# Patient Record
Sex: FEMALE | Race: WHITE | NOT HISPANIC OR LATINO | URBAN - METROPOLITAN AREA
[De-identification: names, ages, dates, MRNs, and addresses within clinical notes are randomized per-mention and may not be internally consistent; named-entity substitution may affect disease eponyms.]

---

## 2017-01-23 ENCOUNTER — OFFICE (OUTPATIENT)
Dept: URBAN - METROPOLITAN AREA CLINIC 14 | Facility: CLINIC | Age: 37
End: 2017-01-23

## 2017-01-23 VITALS
HEART RATE: 37 BPM | WEIGHT: 286 LBS | SYSTOLIC BLOOD PRESSURE: 130 MMHG | DIASTOLIC BLOOD PRESSURE: 54 MMHG | HEIGHT: 64 IN

## 2017-01-23 DIAGNOSIS — Z98.84 BARIATRIC SURGERY STATUS: ICD-10-CM

## 2017-01-23 DIAGNOSIS — K21.9 GASTRO-ESOPHAGEAL REFLUX DISEASE WITHOUT ESOPHAGITIS: ICD-10-CM

## 2017-01-23 DIAGNOSIS — R10.12 LEFT UPPER QUADRANT PAIN: ICD-10-CM

## 2017-01-23 DIAGNOSIS — R13.10 DYSPHAGIA, UNSPECIFIED: ICD-10-CM

## 2017-01-23 DIAGNOSIS — E66.01 MORBID (SEVERE) OBESITY DUE TO EXCESS CALORIES: ICD-10-CM

## 2017-01-23 PROCEDURE — 99214 OFFICE O/P EST MOD 30 MIN: CPT | Performed by: NURSE PRACTITIONER

## 2017-01-23 RX ORDER — HYOSCYAMINE SULFATE 0.12 MG/1
TABLET ORAL
Qty: 40 | Refills: 1 | Status: COMPLETED
Start: 2017-01-23 | End: 2017-01-25

## 2017-02-10 ENCOUNTER — AMBULATORY SURGICAL CENTER (OUTPATIENT)
Dept: URBAN - METROPOLITAN AREA SURGERY 2 | Facility: SURGERY | Age: 37
End: 2017-02-10

## 2017-02-10 ENCOUNTER — OFFICE (OUTPATIENT)
Dept: URBAN - METROPOLITAN AREA CLINIC 11 | Facility: CLINIC | Age: 37
End: 2017-02-10

## 2017-02-10 ENCOUNTER — AMBULATORY SURGICAL CENTER (OUTPATIENT)
Dept: URBAN - METROPOLITAN AREA SURGERY 2 | Facility: SURGERY | Age: 37
End: 2017-02-10
Payer: COMMERCIAL

## 2017-02-10 VITALS
SYSTOLIC BLOOD PRESSURE: 93 MMHG | RESPIRATION RATE: 14 BRPM | HEART RATE: 70 BPM | SYSTOLIC BLOOD PRESSURE: 95 MMHG | HEART RATE: 71 BPM | SYSTOLIC BLOOD PRESSURE: 97 MMHG | OXYGEN SATURATION: 99 % | SYSTOLIC BLOOD PRESSURE: 97 MMHG | RESPIRATION RATE: 14 BRPM | DIASTOLIC BLOOD PRESSURE: 56 MMHG | SYSTOLIC BLOOD PRESSURE: 93 MMHG | HEART RATE: 67 BPM | DIASTOLIC BLOOD PRESSURE: 35 MMHG | OXYGEN SATURATION: 99 % | DIASTOLIC BLOOD PRESSURE: 67 MMHG | WEIGHT: 280 LBS | HEART RATE: 69 BPM | DIASTOLIC BLOOD PRESSURE: 37 MMHG | OXYGEN SATURATION: 98 % | TEMPERATURE: 98 F | DIASTOLIC BLOOD PRESSURE: 56 MMHG | SYSTOLIC BLOOD PRESSURE: 105 MMHG | RESPIRATION RATE: 16 BRPM | OXYGEN SATURATION: 98 % | HEART RATE: 70 BPM | HEIGHT: 64 IN | HEIGHT: 64 IN | WEIGHT: 280 LBS | DIASTOLIC BLOOD PRESSURE: 67 MMHG | DIASTOLIC BLOOD PRESSURE: 37 MMHG | HEART RATE: 69 BPM | SYSTOLIC BLOOD PRESSURE: 95 MMHG | DIASTOLIC BLOOD PRESSURE: 35 MMHG | HEART RATE: 71 BPM | HEART RATE: 67 BPM | RESPIRATION RATE: 16 BRPM | SYSTOLIC BLOOD PRESSURE: 105 MMHG | TEMPERATURE: 98 F

## 2017-02-10 DIAGNOSIS — R93.3 ABNORMAL FINDINGS ON DIAGNOSTIC IMAGING OF OTHER PARTS OF DI: ICD-10-CM

## 2017-02-10 DIAGNOSIS — K25.9 GASTRIC ULCER, UNSPECIFIED AS ACUTE OR CHRONIC, WITHOUT HEMO: ICD-10-CM

## 2017-02-10 DIAGNOSIS — Z98.84 BARIATRIC SURGERY STATUS: ICD-10-CM

## 2017-02-10 DIAGNOSIS — R13.10 DYSPHAGIA, UNSPECIFIED: ICD-10-CM

## 2017-02-10 DIAGNOSIS — K22.2 ESOPHAGEAL OBSTRUCTION: ICD-10-CM

## 2017-02-10 DIAGNOSIS — Z48.815 ENCOUNTER FOR SURGICAL AFTERCARE FOLLOWING SURGERY ON THE DI: ICD-10-CM

## 2017-02-10 PROCEDURE — 88342 IMHCHEM/IMCYTCHM 1ST ANTB: CPT | Performed by: INTERNAL MEDICINE

## 2017-02-10 PROCEDURE — 43239 EGD BIOPSY SINGLE/MULTIPLE: CPT | Mod: 51 | Performed by: INTERNAL MEDICINE

## 2017-02-10 PROCEDURE — 88313 SPECIAL STAINS GROUP 2: CPT | Performed by: INTERNAL MEDICINE

## 2017-02-10 PROCEDURE — 43249 ESOPH EGD DILATION <30 MM: CPT | Performed by: INTERNAL MEDICINE

## 2017-02-10 PROCEDURE — 88305 TISSUE EXAM BY PATHOLOGIST: CPT | Performed by: INTERNAL MEDICINE

## 2017-02-10 PROCEDURE — G8918 PT W/O PREOP ORDER IV AB PRO: HCPCS | Performed by: INTERNAL MEDICINE

## 2017-02-10 PROCEDURE — G8907 PT DOC NO EVENTS ON DISCHARG: HCPCS | Performed by: INTERNAL MEDICINE

## 2017-06-05 ENCOUNTER — OFFICE (OUTPATIENT)
Dept: URBAN - METROPOLITAN AREA CLINIC 14 | Facility: CLINIC | Age: 37
End: 2017-06-05

## 2017-06-05 VITALS
WEIGHT: 289 LBS | DIASTOLIC BLOOD PRESSURE: 54 MMHG | HEIGHT: 64 IN | HEART RATE: 42 BPM | SYSTOLIC BLOOD PRESSURE: 95 MMHG

## 2017-06-05 DIAGNOSIS — R19.7 DIARRHEA, UNSPECIFIED: ICD-10-CM

## 2017-06-05 PROCEDURE — 99214 OFFICE O/P EST MOD 30 MIN: CPT | Performed by: INTERNAL MEDICINE

## 2017-06-05 RX ORDER — HYOSCYAMINE SULFATE EXTENDED-RELEASE 0.38 MG/1
TABLET ORAL
Qty: 60 | Refills: 1 | Status: ACTIVE
Start: 2017-06-05

## 2017-06-07 LAB — MOLECULAR STOOL PATHOGEN PANEL: STPANL: (no result)

## 2017-06-22 ENCOUNTER — OFFICE (OUTPATIENT)
Dept: URBAN - METROPOLITAN AREA CLINIC 14 | Facility: CLINIC | Age: 37
End: 2017-06-22

## 2017-06-22 VITALS
DIASTOLIC BLOOD PRESSURE: 57 MMHG | WEIGHT: 293 LBS | HEIGHT: 64 IN | SYSTOLIC BLOOD PRESSURE: 112 MMHG | HEART RATE: 36 BPM

## 2017-06-22 DIAGNOSIS — Z98.84 BARIATRIC SURGERY STATUS: ICD-10-CM

## 2017-06-22 DIAGNOSIS — R13.10 DYSPHAGIA, UNSPECIFIED: ICD-10-CM

## 2017-06-22 DIAGNOSIS — A04.7 ENTEROCOLITIS DUE TO CLOSTRIDIUM DIFFICILE: ICD-10-CM

## 2017-06-22 PROCEDURE — 99214 OFFICE O/P EST MOD 30 MIN: CPT | Performed by: NURSE PRACTITIONER

## 2017-08-04 ENCOUNTER — AMBULATORY SURGICAL CENTER (OUTPATIENT)
Dept: URBAN - METROPOLITAN AREA SURGERY 2 | Facility: SURGERY | Age: 37
End: 2017-08-04

## 2017-08-04 ENCOUNTER — AMBULATORY SURGICAL CENTER (OUTPATIENT)
Dept: URBAN - METROPOLITAN AREA SURGERY 2 | Facility: SURGERY | Age: 37
End: 2017-08-04
Payer: COMMERCIAL

## 2017-08-04 VITALS
DIASTOLIC BLOOD PRESSURE: 57 MMHG | SYSTOLIC BLOOD PRESSURE: 108 MMHG | DIASTOLIC BLOOD PRESSURE: 59 MMHG | RESPIRATION RATE: 18 BRPM | SYSTOLIC BLOOD PRESSURE: 112 MMHG | SYSTOLIC BLOOD PRESSURE: 101 MMHG | TEMPERATURE: 98.3 F | DIASTOLIC BLOOD PRESSURE: 55 MMHG | OXYGEN SATURATION: 98 % | SYSTOLIC BLOOD PRESSURE: 107 MMHG | OXYGEN SATURATION: 97 % | HEIGHT: 64 IN | RESPIRATION RATE: 16 BRPM | OXYGEN SATURATION: 99 % | DIASTOLIC BLOOD PRESSURE: 55 MMHG | OXYGEN SATURATION: 97 % | RESPIRATION RATE: 16 BRPM | DIASTOLIC BLOOD PRESSURE: 57 MMHG | TEMPERATURE: 98.1 F | SYSTOLIC BLOOD PRESSURE: 107 MMHG | HEART RATE: 57 BPM | OXYGEN SATURATION: 98 % | HEART RATE: 59 BPM | SYSTOLIC BLOOD PRESSURE: 108 MMHG | HEART RATE: 57 BPM | RESPIRATION RATE: 18 BRPM | HEART RATE: 60 BPM | OXYGEN SATURATION: 99 % | TEMPERATURE: 98.3 F | SYSTOLIC BLOOD PRESSURE: 101 MMHG | HEIGHT: 64 IN | WEIGHT: 293 LBS | DIASTOLIC BLOOD PRESSURE: 59 MMHG | DIASTOLIC BLOOD PRESSURE: 60 MMHG | TEMPERATURE: 98.1 F | DIASTOLIC BLOOD PRESSURE: 60 MMHG | WEIGHT: 293 LBS | HEART RATE: 59 BPM | SYSTOLIC BLOOD PRESSURE: 112 MMHG | HEART RATE: 60 BPM

## 2017-08-04 DIAGNOSIS — K22.2 ESOPHAGEAL OBSTRUCTION: ICD-10-CM

## 2017-08-04 DIAGNOSIS — R13.10 DYSPHAGIA, UNSPECIFIED: ICD-10-CM

## 2017-08-04 DIAGNOSIS — Z48.815 ENCOUNTER FOR SURGICAL AFTERCARE FOLLOWING SURGERY ON THE DI: ICD-10-CM

## 2017-08-04 PROCEDURE — 43249 ESOPH EGD DILATION <30 MM: CPT | Performed by: INTERNAL MEDICINE

## 2018-01-15 ENCOUNTER — OFFICE (OUTPATIENT)
Dept: URBAN - METROPOLITAN AREA CLINIC 14 | Facility: CLINIC | Age: 38
End: 2018-01-15

## 2018-01-15 VITALS
HEART RATE: 44 BPM | HEIGHT: 64 IN | DIASTOLIC BLOOD PRESSURE: 57 MMHG | SYSTOLIC BLOOD PRESSURE: 119 MMHG | WEIGHT: 293 LBS

## 2018-01-15 DIAGNOSIS — K25.9 GASTRIC ULCER, UNSPECIFIED AS ACUTE OR CHRONIC, WITHOUT HEMO: ICD-10-CM

## 2018-01-15 DIAGNOSIS — R13.10 DYSPHAGIA, UNSPECIFIED: ICD-10-CM

## 2018-01-15 PROCEDURE — 99213 OFFICE O/P EST LOW 20 MIN: CPT | Performed by: INTERNAL MEDICINE

## 2018-01-23 ENCOUNTER — AMBULATORY SURGICAL CENTER (OUTPATIENT)
Dept: URBAN - METROPOLITAN AREA SURGERY 2 | Facility: SURGERY | Age: 38
End: 2018-01-23

## 2018-01-23 VITALS
HEART RATE: 62 BPM | HEART RATE: 62 BPM | SYSTOLIC BLOOD PRESSURE: 101 MMHG | DIASTOLIC BLOOD PRESSURE: 67 MMHG | TEMPERATURE: 98.4 F | SYSTOLIC BLOOD PRESSURE: 100 MMHG | HEART RATE: 60 BPM | DIASTOLIC BLOOD PRESSURE: 67 MMHG | OXYGEN SATURATION: 96 % | RESPIRATION RATE: 16 BRPM | RESPIRATION RATE: 18 BRPM | SYSTOLIC BLOOD PRESSURE: 100 MMHG | TEMPERATURE: 98.4 F | DIASTOLIC BLOOD PRESSURE: 51 MMHG | OXYGEN SATURATION: 98 % | HEIGHT: 64 IN | WEIGHT: 293 LBS | OXYGEN SATURATION: 97 % | DIASTOLIC BLOOD PRESSURE: 51 MMHG | HEART RATE: 64 BPM | SYSTOLIC BLOOD PRESSURE: 109 MMHG | SYSTOLIC BLOOD PRESSURE: 101 MMHG | SYSTOLIC BLOOD PRESSURE: 109 MMHG | DIASTOLIC BLOOD PRESSURE: 55 MMHG | HEART RATE: 65 BPM | HEIGHT: 64 IN | HEART RATE: 64 BPM | OXYGEN SATURATION: 98 % | RESPIRATION RATE: 18 BRPM | DIASTOLIC BLOOD PRESSURE: 43 MMHG | RESPIRATION RATE: 16 BRPM | OXYGEN SATURATION: 96 % | HEART RATE: 60 BPM | TEMPERATURE: 98.2 F | OXYGEN SATURATION: 97 % | DIASTOLIC BLOOD PRESSURE: 55 MMHG | TEMPERATURE: 98.2 F | DIASTOLIC BLOOD PRESSURE: 43 MMHG | WEIGHT: 293 LBS | HEART RATE: 65 BPM

## 2018-01-23 DIAGNOSIS — Z48.815 ENCOUNTER FOR SURGICAL AFTERCARE FOLLOWING SURGERY ON THE DI: ICD-10-CM

## 2018-01-23 DIAGNOSIS — K22.2 ESOPHAGEAL OBSTRUCTION: ICD-10-CM

## 2018-01-23 DIAGNOSIS — R13.10 DYSPHAGIA, UNSPECIFIED: ICD-10-CM

## 2018-01-23 DIAGNOSIS — R10.12 LEFT UPPER QUADRANT PAIN: ICD-10-CM

## 2018-01-23 PROCEDURE — 43248 EGD GUIDE WIRE INSERTION: CPT | Performed by: INTERNAL MEDICINE

## 2018-05-01 ENCOUNTER — OFFICE (OUTPATIENT)
Dept: URBAN - METROPOLITAN AREA CLINIC 11 | Facility: CLINIC | Age: 38
End: 2018-05-01

## 2018-05-01 VITALS
HEIGHT: 64 IN | SYSTOLIC BLOOD PRESSURE: 122 MMHG | WEIGHT: 293 LBS | HEART RATE: 74 BPM | DIASTOLIC BLOOD PRESSURE: 61 MMHG

## 2018-05-01 DIAGNOSIS — R11.0 NAUSEA: ICD-10-CM

## 2018-05-01 DIAGNOSIS — Z98.84 BARIATRIC SURGERY STATUS: ICD-10-CM

## 2018-05-01 DIAGNOSIS — K21.9 GASTRO-ESOPHAGEAL REFLUX DISEASE WITHOUT ESOPHAGITIS: ICD-10-CM

## 2018-05-01 DIAGNOSIS — R13.10 DYSPHAGIA, UNSPECIFIED: ICD-10-CM

## 2018-05-01 DIAGNOSIS — R10.11 RIGHT UPPER QUADRANT PAIN: ICD-10-CM

## 2018-05-01 PROCEDURE — 99214 OFFICE O/P EST MOD 30 MIN: CPT | Performed by: NURSE PRACTITIONER

## 2018-05-17 ENCOUNTER — AMBULATORY SURGICAL CENTER (OUTPATIENT)
Dept: URBAN - METROPOLITAN AREA SURGERY 3 | Facility: SURGERY | Age: 38
End: 2018-05-17

## 2018-05-17 ENCOUNTER — AMBULATORY SURGICAL CENTER (OUTPATIENT)
Dept: URBAN - METROPOLITAN AREA SURGERY 3 | Facility: SURGERY | Age: 38
End: 2018-05-17
Payer: COMMERCIAL

## 2018-05-17 ENCOUNTER — OFFICE (OUTPATIENT)
Dept: URBAN - METROPOLITAN AREA PATHOLOGY 22 | Facility: PATHOLOGY | Age: 38
End: 2018-05-17

## 2018-05-17 VITALS
HEART RATE: 68 BPM | DIASTOLIC BLOOD PRESSURE: 74 MMHG | RESPIRATION RATE: 17 BRPM | HEART RATE: 56 BPM | OXYGEN SATURATION: 98 % | DIASTOLIC BLOOD PRESSURE: 69 MMHG | SYSTOLIC BLOOD PRESSURE: 103 MMHG | HEART RATE: 55 BPM | OXYGEN SATURATION: 97 % | DIASTOLIC BLOOD PRESSURE: 58 MMHG | DIASTOLIC BLOOD PRESSURE: 69 MMHG | OXYGEN SATURATION: 95 % | DIASTOLIC BLOOD PRESSURE: 74 MMHG | SYSTOLIC BLOOD PRESSURE: 112 MMHG | HEART RATE: 45 BPM | DIASTOLIC BLOOD PRESSURE: 58 MMHG | DIASTOLIC BLOOD PRESSURE: 61 MMHG | SYSTOLIC BLOOD PRESSURE: 101 MMHG | SYSTOLIC BLOOD PRESSURE: 100 MMHG | HEART RATE: 40 BPM | DIASTOLIC BLOOD PRESSURE: 44 MMHG | HEART RATE: 59 BPM | SYSTOLIC BLOOD PRESSURE: 115 MMHG | TEMPERATURE: 98.3 F | SYSTOLIC BLOOD PRESSURE: 132 MMHG | SYSTOLIC BLOOD PRESSURE: 116 MMHG | OXYGEN SATURATION: 91 % | HEART RATE: 40 BPM | HEART RATE: 64 BPM | RESPIRATION RATE: 20 BRPM | OXYGEN SATURATION: 91 % | OXYGEN SATURATION: 92 % | SYSTOLIC BLOOD PRESSURE: 96 MMHG | SYSTOLIC BLOOD PRESSURE: 129 MMHG | DIASTOLIC BLOOD PRESSURE: 67 MMHG | DIASTOLIC BLOOD PRESSURE: 47 MMHG | DIASTOLIC BLOOD PRESSURE: 40 MMHG | TEMPERATURE: 98.3 F | RESPIRATION RATE: 17 BRPM | SYSTOLIC BLOOD PRESSURE: 119 MMHG | RESPIRATION RATE: 15 BRPM | SYSTOLIC BLOOD PRESSURE: 96 MMHG | DIASTOLIC BLOOD PRESSURE: 76 MMHG | DIASTOLIC BLOOD PRESSURE: 68 MMHG | SYSTOLIC BLOOD PRESSURE: 106 MMHG | SYSTOLIC BLOOD PRESSURE: 125 MMHG | HEIGHT: 64 IN | HEART RATE: 72 BPM | HEART RATE: 55 BPM | RESPIRATION RATE: 20 BRPM | HEART RATE: 42 BPM | HEART RATE: 64 BPM | DIASTOLIC BLOOD PRESSURE: 57 MMHG | SYSTOLIC BLOOD PRESSURE: 125 MMHG | OXYGEN SATURATION: 97 % | SYSTOLIC BLOOD PRESSURE: 103 MMHG | HEART RATE: 69 BPM | WEIGHT: 293 LBS | SYSTOLIC BLOOD PRESSURE: 115 MMHG | SYSTOLIC BLOOD PRESSURE: 120 MMHG | DIASTOLIC BLOOD PRESSURE: 56 MMHG | DIASTOLIC BLOOD PRESSURE: 52 MMHG | DIASTOLIC BLOOD PRESSURE: 61 MMHG | RESPIRATION RATE: 14 BRPM | HEART RATE: 70 BPM | DIASTOLIC BLOOD PRESSURE: 36 MMHG | RESPIRATION RATE: 14 BRPM | DIASTOLIC BLOOD PRESSURE: 57 MMHG | SYSTOLIC BLOOD PRESSURE: 112 MMHG | HEART RATE: 61 BPM | HEART RATE: 59 BPM | TEMPERATURE: 97 F | SYSTOLIC BLOOD PRESSURE: 120 MMHG | SYSTOLIC BLOOD PRESSURE: 99 MMHG | SYSTOLIC BLOOD PRESSURE: 106 MMHG | SYSTOLIC BLOOD PRESSURE: 118 MMHG | DIASTOLIC BLOOD PRESSURE: 37 MMHG | HEART RATE: 69 BPM | OXYGEN SATURATION: 96 % | OXYGEN SATURATION: 92 % | RESPIRATION RATE: 15 BRPM | DIASTOLIC BLOOD PRESSURE: 47 MMHG | HEART RATE: 62 BPM | RESPIRATION RATE: 18 BRPM | DIASTOLIC BLOOD PRESSURE: 36 MMHG | WEIGHT: 293 LBS | HEART RATE: 36 BPM | RESPIRATION RATE: 16 BRPM | HEART RATE: 56 BPM | SYSTOLIC BLOOD PRESSURE: 111 MMHG | RESPIRATION RATE: 18 BRPM | HEART RATE: 45 BPM | DIASTOLIC BLOOD PRESSURE: 67 MMHG | DIASTOLIC BLOOD PRESSURE: 76 MMHG | DIASTOLIC BLOOD PRESSURE: 52 MMHG | RESPIRATION RATE: 16 BRPM | HEART RATE: 61 BPM | TEMPERATURE: 97 F | SYSTOLIC BLOOD PRESSURE: 132 MMHG | RESPIRATION RATE: 19 BRPM | SYSTOLIC BLOOD PRESSURE: 118 MMHG | RESPIRATION RATE: 19 BRPM | OXYGEN SATURATION: 98 % | HEART RATE: 72 BPM | HEART RATE: 42 BPM | DIASTOLIC BLOOD PRESSURE: 64 MMHG | HEART RATE: 36 BPM | SYSTOLIC BLOOD PRESSURE: 100 MMHG | OXYGEN SATURATION: 95 % | SYSTOLIC BLOOD PRESSURE: 116 MMHG | HEART RATE: 68 BPM | DIASTOLIC BLOOD PRESSURE: 37 MMHG | SYSTOLIC BLOOD PRESSURE: 129 MMHG | HEART RATE: 74 BPM | SYSTOLIC BLOOD PRESSURE: 101 MMHG | OXYGEN SATURATION: 99 % | DIASTOLIC BLOOD PRESSURE: 40 MMHG | HEART RATE: 70 BPM | DIASTOLIC BLOOD PRESSURE: 68 MMHG | SYSTOLIC BLOOD PRESSURE: 99 MMHG | DIASTOLIC BLOOD PRESSURE: 44 MMHG | HEART RATE: 74 BPM | DIASTOLIC BLOOD PRESSURE: 64 MMHG | HEART RATE: 62 BPM | DIASTOLIC BLOOD PRESSURE: 56 MMHG | HEIGHT: 64 IN | SYSTOLIC BLOOD PRESSURE: 111 MMHG | OXYGEN SATURATION: 96 % | SYSTOLIC BLOOD PRESSURE: 119 MMHG | OXYGEN SATURATION: 99 %

## 2018-05-17 DIAGNOSIS — K22.2 ESOPHAGEAL OBSTRUCTION: ICD-10-CM

## 2018-05-17 DIAGNOSIS — R13.10 DYSPHAGIA, UNSPECIFIED: ICD-10-CM

## 2018-05-17 DIAGNOSIS — K29.50 UNSPECIFIED CHRONIC GASTRITIS WITHOUT BLEEDING: ICD-10-CM

## 2018-05-17 DIAGNOSIS — R10.11 RIGHT UPPER QUADRANT PAIN: ICD-10-CM

## 2018-05-17 PROBLEM — K31.89 OTHER DISEASES OF STOMACH AND DUODENUM: Status: ACTIVE | Noted: 2018-05-17

## 2018-05-17 PROCEDURE — 43249 ESOPH EGD DILATION <30 MM: CPT | Performed by: INTERNAL MEDICINE

## 2018-05-17 PROCEDURE — 88313 SPECIAL STAINS GROUP 2: CPT | Performed by: INTERNAL MEDICINE

## 2018-05-17 PROCEDURE — G8907 PT DOC NO EVENTS ON DISCHARG: HCPCS | Performed by: INTERNAL MEDICINE

## 2018-05-17 PROCEDURE — 43239 EGD BIOPSY SINGLE/MULTIPLE: CPT | Mod: 59 | Performed by: INTERNAL MEDICINE

## 2018-05-17 PROCEDURE — 88305 TISSUE EXAM BY PATHOLOGIST: CPT | Performed by: INTERNAL MEDICINE

## 2018-05-17 PROCEDURE — G8918 PT W/O PREOP ORDER IV AB PRO: HCPCS | Performed by: INTERNAL MEDICINE

## 2018-05-17 PROCEDURE — 88342 IMHCHEM/IMCYTCHM 1ST ANTB: CPT | Performed by: INTERNAL MEDICINE

## 2018-06-18 ENCOUNTER — OFFICE (OUTPATIENT)
Dept: URBAN - METROPOLITAN AREA CLINIC 11 | Facility: CLINIC | Age: 38
End: 2018-06-18

## 2018-12-06 ENCOUNTER — OFFICE (OUTPATIENT)
Dept: URBAN - METROPOLITAN AREA CLINIC 14 | Facility: CLINIC | Age: 38
End: 2018-12-06

## 2018-12-06 VITALS
WEIGHT: 293 LBS | DIASTOLIC BLOOD PRESSURE: 80 MMHG | HEART RATE: 108 BPM | HEIGHT: 64 IN | SYSTOLIC BLOOD PRESSURE: 153 MMHG

## 2018-12-06 DIAGNOSIS — R13.10 DYSPHAGIA, UNSPECIFIED: ICD-10-CM

## 2018-12-06 PROCEDURE — 99213 OFFICE O/P EST LOW 20 MIN: CPT | Performed by: INTERNAL MEDICINE

## 2018-12-07 ENCOUNTER — AMBULATORY SURGICAL CENTER (OUTPATIENT)
Dept: URBAN - METROPOLITAN AREA SURGERY 2 | Facility: SURGERY | Age: 38
End: 2018-12-07
Payer: COMMERCIAL

## 2018-12-07 ENCOUNTER — AMBULATORY SURGICAL CENTER (OUTPATIENT)
Dept: URBAN - METROPOLITAN AREA SURGERY 2 | Facility: SURGERY | Age: 38
End: 2018-12-07

## 2018-12-07 VITALS
OXYGEN SATURATION: 98 % | DIASTOLIC BLOOD PRESSURE: 73 MMHG | SYSTOLIC BLOOD PRESSURE: 123 MMHG | RESPIRATION RATE: 14 BRPM | HEART RATE: 68 BPM | DIASTOLIC BLOOD PRESSURE: 56 MMHG | WEIGHT: 293 LBS | DIASTOLIC BLOOD PRESSURE: 56 MMHG | HEART RATE: 75 BPM | DIASTOLIC BLOOD PRESSURE: 59 MMHG | SYSTOLIC BLOOD PRESSURE: 119 MMHG | HEART RATE: 67 BPM | HEIGHT: 64 IN | TEMPERATURE: 98.3 F | HEART RATE: 76 BPM | SYSTOLIC BLOOD PRESSURE: 119 MMHG | DIASTOLIC BLOOD PRESSURE: 73 MMHG | SYSTOLIC BLOOD PRESSURE: 123 MMHG | SYSTOLIC BLOOD PRESSURE: 129 MMHG | HEART RATE: 67 BPM | HEART RATE: 76 BPM | HEIGHT: 64 IN | WEIGHT: 293 LBS | SYSTOLIC BLOOD PRESSURE: 111 MMHG | TEMPERATURE: 97.8 F | OXYGEN SATURATION: 97 % | DIASTOLIC BLOOD PRESSURE: 84 MMHG | DIASTOLIC BLOOD PRESSURE: 84 MMHG | OXYGEN SATURATION: 95 % | RESPIRATION RATE: 14 BRPM | OXYGEN SATURATION: 98 % | OXYGEN SATURATION: 97 % | SYSTOLIC BLOOD PRESSURE: 111 MMHG | HEART RATE: 75 BPM | OXYGEN SATURATION: 95 % | HEART RATE: 68 BPM | TEMPERATURE: 98.3 F | SYSTOLIC BLOOD PRESSURE: 129 MMHG | DIASTOLIC BLOOD PRESSURE: 59 MMHG | TEMPERATURE: 97.8 F

## 2018-12-07 DIAGNOSIS — K22.2 ESOPHAGEAL OBSTRUCTION: ICD-10-CM

## 2018-12-07 DIAGNOSIS — R13.10 DYSPHAGIA, UNSPECIFIED: ICD-10-CM

## 2018-12-07 DIAGNOSIS — Z48.815 ENCOUNTER FOR SURGICAL AFTERCARE FOLLOWING SURGERY ON THE DI: ICD-10-CM

## 2018-12-07 PROCEDURE — 43249 ESOPH EGD DILATION <30 MM: CPT | Performed by: INTERNAL MEDICINE

## 2018-12-07 PROCEDURE — G8907 PT DOC NO EVENTS ON DISCHARG: HCPCS | Performed by: INTERNAL MEDICINE

## 2018-12-07 PROCEDURE — G8918 PT W/O PREOP ORDER IV AB PRO: HCPCS | Performed by: INTERNAL MEDICINE

## 2019-02-11 ENCOUNTER — OFFICE (OUTPATIENT)
Dept: URBAN - METROPOLITAN AREA CLINIC 14 | Facility: CLINIC | Age: 39
End: 2019-02-11

## 2019-02-11 VITALS
WEIGHT: 293 LBS | HEART RATE: 85 BPM | DIASTOLIC BLOOD PRESSURE: 62 MMHG | HEIGHT: 64 IN | SYSTOLIC BLOOD PRESSURE: 131 MMHG

## 2019-02-11 DIAGNOSIS — R10.12 LEFT UPPER QUADRANT PAIN: ICD-10-CM

## 2019-02-11 DIAGNOSIS — R11.0 NAUSEA: ICD-10-CM

## 2019-02-11 PROCEDURE — 99213 OFFICE O/P EST LOW 20 MIN: CPT | Performed by: INTERNAL MEDICINE

## 2019-02-21 ENCOUNTER — AMBULATORY SURGICAL CENTER (OUTPATIENT)
Dept: URBAN - METROPOLITAN AREA SURGERY 2 | Facility: SURGERY | Age: 39
End: 2019-02-21

## 2019-02-21 ENCOUNTER — OFFICE (OUTPATIENT)
Dept: URBAN - METROPOLITAN AREA PATHOLOGY 22 | Facility: PATHOLOGY | Age: 39
End: 2019-02-21
Payer: MEDICARE

## 2019-02-21 ENCOUNTER — AMBULATORY SURGICAL CENTER (OUTPATIENT)
Dept: URBAN - METROPOLITAN AREA SURGERY 2 | Facility: SURGERY | Age: 39
End: 2019-02-21
Payer: COMMERCIAL

## 2019-02-21 VITALS
WEIGHT: 293 LBS | RESPIRATION RATE: 18 BRPM | OXYGEN SATURATION: 97 % | HEIGHT: 64 IN | RESPIRATION RATE: 16 BRPM | SYSTOLIC BLOOD PRESSURE: 143 MMHG | DIASTOLIC BLOOD PRESSURE: 70 MMHG | OXYGEN SATURATION: 96 % | TEMPERATURE: 98.5 F | OXYGEN SATURATION: 96 % | HEART RATE: 75 BPM | OXYGEN SATURATION: 95 % | TEMPERATURE: 98.4 F | SYSTOLIC BLOOD PRESSURE: 140 MMHG | SYSTOLIC BLOOD PRESSURE: 147 MMHG | HEART RATE: 71 BPM | DIASTOLIC BLOOD PRESSURE: 77 MMHG | TEMPERATURE: 98.4 F | SYSTOLIC BLOOD PRESSURE: 135 MMHG | DIASTOLIC BLOOD PRESSURE: 67 MMHG | DIASTOLIC BLOOD PRESSURE: 91 MMHG | HEART RATE: 73 BPM | RESPIRATION RATE: 16 BRPM | RESPIRATION RATE: 18 BRPM | DIASTOLIC BLOOD PRESSURE: 70 MMHG | HEIGHT: 64 IN | SYSTOLIC BLOOD PRESSURE: 147 MMHG | HEART RATE: 71 BPM | SYSTOLIC BLOOD PRESSURE: 135 MMHG | SYSTOLIC BLOOD PRESSURE: 140 MMHG | DIASTOLIC BLOOD PRESSURE: 67 MMHG | DIASTOLIC BLOOD PRESSURE: 77 MMHG | HEART RATE: 73 BPM | OXYGEN SATURATION: 95 % | WEIGHT: 293 LBS | TEMPERATURE: 98.5 F | SYSTOLIC BLOOD PRESSURE: 143 MMHG | DIASTOLIC BLOOD PRESSURE: 91 MMHG | OXYGEN SATURATION: 97 % | HEART RATE: 75 BPM

## 2019-02-21 DIAGNOSIS — E66.01 MORBID (SEVERE) OBESITY DUE TO EXCESS CALORIES: ICD-10-CM

## 2019-02-21 DIAGNOSIS — Z48.815 ENCOUNTER FOR SURGICAL AFTERCARE FOLLOWING SURGERY ON THE DI: ICD-10-CM

## 2019-02-21 DIAGNOSIS — R10.10 UPPER ABDOMINAL PAIN, UNSPECIFIED: ICD-10-CM

## 2019-02-21 DIAGNOSIS — K25.9 GASTRIC ULCER, UNSPECIFIED AS ACUTE OR CHRONIC, WITHOUT HEMO: ICD-10-CM

## 2019-02-21 PROBLEM — K31.89 OTHER DISEASES OF STOMACH AND DUODENUM: Status: ACTIVE | Noted: 2019-02-21

## 2019-02-21 PROCEDURE — 88305 TISSUE EXAM BY PATHOLOGIST: CPT | Performed by: INTERNAL MEDICINE

## 2019-02-21 PROCEDURE — G8907 PT DOC NO EVENTS ON DISCHARG: HCPCS | Performed by: INTERNAL MEDICINE

## 2019-02-21 PROCEDURE — G8918 PT W/O PREOP ORDER IV AB PRO: HCPCS | Performed by: INTERNAL MEDICINE

## 2019-02-21 PROCEDURE — 43239 EGD BIOPSY SINGLE/MULTIPLE: CPT | Performed by: INTERNAL MEDICINE

## 2019-06-11 ENCOUNTER — OFFICE (OUTPATIENT)
Dept: URBAN - METROPOLITAN AREA CLINIC 11 | Facility: CLINIC | Age: 39
End: 2019-06-11

## 2019-06-11 VITALS
HEART RATE: 84 BPM | DIASTOLIC BLOOD PRESSURE: 76 MMHG | SYSTOLIC BLOOD PRESSURE: 122 MMHG | HEIGHT: 64 IN | WEIGHT: 293 LBS

## 2019-06-11 DIAGNOSIS — Z98.84 BARIATRIC SURGERY STATUS: ICD-10-CM

## 2019-06-11 DIAGNOSIS — K59.00 CONSTIPATION, UNSPECIFIED: ICD-10-CM

## 2019-06-11 DIAGNOSIS — R13.10 DYSPHAGIA, UNSPECIFIED: ICD-10-CM

## 2019-06-11 PROCEDURE — 99213 OFFICE O/P EST LOW 20 MIN: CPT | Performed by: INTERNAL MEDICINE

## 2019-08-16 ENCOUNTER — AMBULATORY SURGICAL CENTER (OUTPATIENT)
Dept: URBAN - METROPOLITAN AREA SURGERY 2 | Facility: SURGERY | Age: 39
End: 2019-08-16

## 2019-08-16 ENCOUNTER — AMBULATORY SURGICAL CENTER (OUTPATIENT)
Dept: URBAN - METROPOLITAN AREA SURGERY 2 | Facility: SURGERY | Age: 39
End: 2019-08-16
Payer: COMMERCIAL

## 2019-08-16 ENCOUNTER — OFFICE (OUTPATIENT)
Dept: URBAN - METROPOLITAN AREA PATHOLOGY 22 | Facility: PATHOLOGY | Age: 39
End: 2019-08-16

## 2019-08-16 VITALS
DIASTOLIC BLOOD PRESSURE: 60 MMHG | SYSTOLIC BLOOD PRESSURE: 122 MMHG | WEIGHT: 293 LBS | DIASTOLIC BLOOD PRESSURE: 70 MMHG | WEIGHT: 293 LBS | RESPIRATION RATE: 18 BRPM | SYSTOLIC BLOOD PRESSURE: 111 MMHG | WEIGHT: 293 LBS | DIASTOLIC BLOOD PRESSURE: 70 MMHG | SYSTOLIC BLOOD PRESSURE: 122 MMHG | TEMPERATURE: 98.1 F | HEIGHT: 64 IN | DIASTOLIC BLOOD PRESSURE: 60 MMHG | HEART RATE: 64 BPM | TEMPERATURE: 98 F | OXYGEN SATURATION: 98 % | SYSTOLIC BLOOD PRESSURE: 111 MMHG | OXYGEN SATURATION: 97 % | HEART RATE: 66 BPM | TEMPERATURE: 98 F | HEIGHT: 64 IN | SYSTOLIC BLOOD PRESSURE: 117 MMHG | OXYGEN SATURATION: 98 % | HEART RATE: 64 BPM | OXYGEN SATURATION: 96 % | RESPIRATION RATE: 18 BRPM | TEMPERATURE: 98.1 F | DIASTOLIC BLOOD PRESSURE: 60 MMHG | OXYGEN SATURATION: 98 % | SYSTOLIC BLOOD PRESSURE: 122 MMHG | DIASTOLIC BLOOD PRESSURE: 70 MMHG | DIASTOLIC BLOOD PRESSURE: 65 MMHG | HEART RATE: 66 BPM | DIASTOLIC BLOOD PRESSURE: 65 MMHG | TEMPERATURE: 98.1 F | TEMPERATURE: 98 F | SYSTOLIC BLOOD PRESSURE: 124 MMHG | OXYGEN SATURATION: 97 % | HEART RATE: 64 BPM | HEART RATE: 66 BPM | SYSTOLIC BLOOD PRESSURE: 117 MMHG | OXYGEN SATURATION: 97 % | HEIGHT: 64 IN | SYSTOLIC BLOOD PRESSURE: 124 MMHG | SYSTOLIC BLOOD PRESSURE: 117 MMHG | DIASTOLIC BLOOD PRESSURE: 65 MMHG | SYSTOLIC BLOOD PRESSURE: 124 MMHG | OXYGEN SATURATION: 96 % | OXYGEN SATURATION: 96 % | SYSTOLIC BLOOD PRESSURE: 111 MMHG | RESPIRATION RATE: 18 BRPM

## 2019-08-16 DIAGNOSIS — K31.89 OTHER DISEASES OF STOMACH AND DUODENUM: ICD-10-CM

## 2019-08-16 DIAGNOSIS — R11.2 NAUSEA WITH VOMITING, UNSPECIFIED: ICD-10-CM

## 2019-08-16 DIAGNOSIS — R10.12 LEFT UPPER QUADRANT PAIN: ICD-10-CM

## 2019-08-16 DIAGNOSIS — Z98.84 BARIATRIC SURGERY STATUS: ICD-10-CM

## 2019-08-16 PROCEDURE — 88313 SPECIAL STAINS GROUP 2: CPT | Performed by: INTERNAL MEDICINE

## 2019-08-16 PROCEDURE — 88305 TISSUE EXAM BY PATHOLOGIST: CPT | Performed by: INTERNAL MEDICINE

## 2019-08-16 PROCEDURE — 43239 EGD BIOPSY SINGLE/MULTIPLE: CPT | Performed by: INTERNAL MEDICINE

## 2019-08-16 PROCEDURE — 88342 IMHCHEM/IMCYTCHM 1ST ANTB: CPT | Performed by: INTERNAL MEDICINE

## 2020-05-11 VITALS — HEIGHT: 64 IN

## 2020-05-14 ENCOUNTER — TELEHEALTH PROVIDED OTHER THAN IN PATIENT'S HOME (OUTPATIENT)
Dept: URBAN - METROPOLITAN AREA TELEHEALTH 10 | Facility: TELEHEALTH | Age: 40
End: 2020-05-14
Payer: COMMERCIAL

## 2020-05-14 DIAGNOSIS — R10.9 UNSPECIFIED ABDOMINAL PAIN: ICD-10-CM

## 2020-05-14 DIAGNOSIS — K59.00 CONSTIPATION, UNSPECIFIED: ICD-10-CM

## 2020-05-14 DIAGNOSIS — R13.10 DYSPHAGIA, UNSPECIFIED: ICD-10-CM

## 2020-05-14 RX ORDER — LINACLOTIDE 290 UG/1
290 CAPSULE, GELATIN COATED ORAL
Qty: 30 | Refills: 5 | Status: ACTIVE
Start: 2020-05-14

## 2020-05-14 NOTE — SERVICEHPINOTES
Gertrudis Gomez   is a   39   year old  female   here today for tele health follow-up due to abdominal pain. She states that recently she has been having were symptoms.  She describes intense pain in her epigastric area that is burning in nature.  She also has had worsening odynophagia and dysphagia.  She did acknowledge running out of pantoprazole which was cold earlier today by my office for her to restart.  She also has had more bloating and gas and belching.  She has had issues with constipation which often responds well to MiraLax and Colace.  Recently though she has been sometimes going 4 days without a bowel movement.  She believes this is mostly worsened due to need for opiates.  She unfortunately cannot take antispasmodics due to multiple allergies. She has avoided NSAIDs since her last visit with me.  She has no exposure to tobacco products.